# Patient Record
Sex: FEMALE | Race: WHITE | NOT HISPANIC OR LATINO | URBAN - METROPOLITAN AREA
[De-identification: names, ages, dates, MRNs, and addresses within clinical notes are randomized per-mention and may not be internally consistent; named-entity substitution may affect disease eponyms.]

---

## 2017-04-26 ENCOUNTER — APPOINTMENT (OUTPATIENT)
Age: 62
Setting detail: DERMATOLOGY
End: 2017-04-27

## 2017-04-26 DIAGNOSIS — D485 NEOPLASM OF UNCERTAIN BEHAVIOR OF SKIN: ICD-10-CM

## 2017-04-26 DIAGNOSIS — L82.0 INFLAMED SEBORRHEIC KERATOSIS: ICD-10-CM

## 2017-04-26 DIAGNOSIS — L82.1 OTHER SEBORRHEIC KERATOSIS: ICD-10-CM

## 2017-04-26 PROBLEM — D48.5 NEOPLASM OF UNCERTAIN BEHAVIOR OF SKIN: Status: ACTIVE | Noted: 2017-04-26

## 2017-04-26 PROCEDURE — 99213 OFFICE O/P EST LOW 20 MIN: CPT | Mod: 25

## 2017-04-26 PROCEDURE — 11100: CPT

## 2017-04-26 PROCEDURE — 11101: CPT

## 2017-04-26 PROCEDURE — OTHER BIOPSY BY SHAVE METHOD: OTHER

## 2017-04-26 PROCEDURE — OTHER COUNSELING: OTHER

## 2017-04-26 ASSESSMENT — LOCATION DETAILED DESCRIPTION DERM
LOCATION DETAILED: RIGHT ANTERIOR PROXIMAL THIGH
LOCATION DETAILED: RIGHT LATERAL UPPER BACK
LOCATION DETAILED: PERIUMBILICAL SKIN
LOCATION DETAILED: RIGHT SUPERIOR UPPER BACK

## 2017-04-26 ASSESSMENT — LOCATION SIMPLE DESCRIPTION DERM
LOCATION SIMPLE: RIGHT THIGH
LOCATION SIMPLE: ABDOMEN
LOCATION SIMPLE: RIGHT UPPER BACK

## 2017-04-26 ASSESSMENT — LOCATION ZONE DERM
LOCATION ZONE: TRUNK
LOCATION ZONE: LEG

## 2017-04-26 NOTE — PROCEDURE: BIOPSY BY SHAVE METHOD
Billing Type: Third-Party Bill
Electrodesiccation And Curettage Text: The wound bed was treated with electrodesiccation and curettage after the biopsy was performed.
Destruction After The Procedure: No
Hemostasis: Electrocautery
Detail Level: Detailed
Curettage Text: The wound bed was treated with curettage after the biopsy was performed.
Silver Nitrate Text: The wound bed was treated with silver nitrate after the biopsy was performed.
Electrodesiccation Text: The wound bed was treated with electrodesiccation after the biopsy was performed.
X Size Of Lesion In Cm: 0.2
Additional Anesthesia Volume In Cc (Will Not Render If 0): 0
Biopsy Method: 15 blade
Anesthesia Type: 1% lidocaine with epinephrine
Type Of Destruction Used: Curettage
Dressing: bandage
Biopsy Type: H and E
Render Post-Care Instructions In Note?: yes
Wound Care: Mupirocin
Anesthesia Volume In Cc (Will Not Render If 0): 0.5
Size Of Lesion In Cm: 0.3
Post-Care Instructions: I reviewed with the patient in detail post-care instructions.
Notification Instructions: Patient will be notified of biopsy results. However, patient instructed to call the office if not contacted within 2 weeks.
Cryotherapy Text: The wound bed was treated with cryotherapy after the biopsy was performed.
Consent: Written consent was obtained and risks were reviewed including but not limited to scarring, infection, bleeding, scabbing, incomplete removal, nerve damage and allergy to anesthesia.

## 2018-02-01 ENCOUNTER — APPOINTMENT (OUTPATIENT)
Age: 63
Setting detail: DERMATOLOGY
End: 2018-02-05

## 2018-02-01 DIAGNOSIS — D485 NEOPLASM OF UNCERTAIN BEHAVIOR OF SKIN: ICD-10-CM

## 2018-02-01 DIAGNOSIS — L57.8 OTHER SKIN CHANGES DUE TO CHRONIC EXPOSURE TO NONIONIZING RADIATION: ICD-10-CM

## 2018-02-01 PROBLEM — D48.5 NEOPLASM OF UNCERTAIN BEHAVIOR OF SKIN: Status: ACTIVE | Noted: 2018-02-01

## 2018-02-01 PROCEDURE — OTHER COUNSELING: OTHER

## 2018-02-01 PROCEDURE — 99213 OFFICE O/P EST LOW 20 MIN: CPT | Mod: 25

## 2018-02-01 PROCEDURE — 69100 BIOPSY OF EXTERNAL EAR: CPT

## 2018-02-01 PROCEDURE — OTHER SUNSCREEN RECOMMENDATIONS: OTHER

## 2018-02-01 PROCEDURE — OTHER BIOPSY BY SHAVE METHOD: OTHER

## 2018-02-01 ASSESSMENT — LOCATION SIMPLE DESCRIPTION DERM
LOCATION SIMPLE: LEFT CHEEK
LOCATION SIMPLE: RIGHT EAR

## 2018-02-01 ASSESSMENT — LOCATION DETAILED DESCRIPTION DERM
LOCATION DETAILED: LEFT CENTRAL MALAR CHEEK
LOCATION DETAILED: RIGHT SUPERIOR HELIX

## 2018-02-01 ASSESSMENT — LOCATION ZONE DERM
LOCATION ZONE: EAR
LOCATION ZONE: FACE

## 2018-02-01 NOTE — PROCEDURE: BIOPSY BY SHAVE METHOD
Bill For Surgical Tray: no
Detail Level: Detailed
Notification Instructions: Patient will be notified of biopsy results. However, patient instructed to call the office if not contacted within 2 weeks.
Hemostasis: Drysol
Silver Nitrate Text: The wound bed was treated with silver nitrate after the biopsy was performed.
Biopsy Type: H and E
Additional Anesthesia Volume In Cc (Will Not Render If 0): 0
Electrodesiccation Text: The wound bed was treated with electrodesiccation after the biopsy was performed.
Curettage Text: The wound bed was treated with curettage after the biopsy was performed.
Post-Care Instructions: I reviewed with the patient in detail post-care instructions. Patient is to keep the biopsy site dry overnight, and then apply bacitracin twice daily until healed. Patient may apply hydrogen peroxide soaks to remove any crusting.
Dressing: bandage
Consent: Written consent was obtained and risks were reviewed including but not limited to scarring, infection, bleeding, scabbing, incomplete removal, nerve damage and allergy to anesthesia.
Anesthesia Type: 1% lidocaine without epinephrine
Anesthesia Volume In Cc (Will Not Render If 0): 0.5
Type Of Destruction Used: Curettage
Cryotherapy Text: The wound bed was treated with cryotherapy after the biopsy was performed.
Electrodesiccation And Curettage Text: The wound bed was treated with electrodesiccation and curettage after the biopsy was performed.
Wound Care: Petrolatum
Billing Type: Third-Party Bill
Biopsy Method: 15 blade